# Patient Record
Sex: MALE | Race: WHITE | Employment: FULL TIME | URBAN - METROPOLITAN AREA
[De-identification: names, ages, dates, MRNs, and addresses within clinical notes are randomized per-mention and may not be internally consistent; named-entity substitution may affect disease eponyms.]

---

## 2019-08-04 ENCOUNTER — HOSPITAL ENCOUNTER (EMERGENCY)
Age: 34
Discharge: HOME | End: 2019-08-04

## 2019-08-04 VITALS
HEART RATE: 99 BPM | BODY MASS INDEX: 30.31 KG/M2 | RESPIRATION RATE: 18 BRPM | HEIGHT: 68 IN | DIASTOLIC BLOOD PRESSURE: 78 MMHG | TEMPERATURE: 98.6 F | WEIGHT: 200 LBS | SYSTOLIC BLOOD PRESSURE: 128 MMHG | OXYGEN SATURATION: 100 %

## 2019-08-04 DIAGNOSIS — H18.891 CORNEAL RUST RING OF RIGHT EYE: ICD-10-CM

## 2019-08-04 DIAGNOSIS — T15.91XA FOREIGN BODY OF RIGHT EXTERNAL EYE, INITIAL ENCOUNTER: Primary | ICD-10-CM

## 2019-08-04 PROCEDURE — 99282 EMERGENCY DEPT VISIT SF MDM: CPT

## 2019-08-04 PROCEDURE — 6370000000 HC RX 637 (ALT 250 FOR IP): Performed by: NURSE PRACTITIONER

## 2019-08-04 RX ORDER — ERYTHROMYCIN 5 MG/G
OINTMENT OPHTHALMIC ONCE
Status: COMPLETED | OUTPATIENT
Start: 2019-08-04 | End: 2019-08-04

## 2019-08-04 RX ORDER — ERYTHROMYCIN 5 MG/G
OINTMENT OPHTHALMIC
Qty: 1 TUBE | Refills: 0 | Status: SHIPPED | OUTPATIENT
Start: 2019-08-04

## 2019-08-04 RX ADMIN — ERYTHROMYCIN: 5 OINTMENT OPHTHALMIC at 22:02

## 2019-08-04 ASSESSMENT — ENCOUNTER SYMPTOMS
EYE DISCHARGE: 0
EYE ITCHING: 0
EYE REDNESS: 1
PHOTOPHOBIA: 1
EYE PAIN: 1

## 2019-08-04 ASSESSMENT — VISUAL ACUITY
OD: 20/40
OS: 20/40
OU: 20/30

## 2019-08-04 ASSESSMENT — PAIN SCALES - GENERAL: PAINLEVEL_OUTOF10: 4

## 2019-08-05 NOTE — ED PROVIDER NOTES
[address] ED  EMERGENCY DEPARTMENT ENCOUNTER        Pt Name: Ahsan Guzman  MRN: [id number]  [place] 1985  Date of evaluation: 8/4/2019  Provider: BRANDON Hand CNP  PCP: No primary care provider on file. This patient was not seen and evaluated by the attending physician No att. providers found. CHIEF COMPLAINT       Chief Complaint   Patient presents with    Foreign Body in Eye     pt. states he was working on his truck x4 days ago and states he thinks there is something in his eye       HISTORY OF PRESENT ILLNESS   (Location/Symptom, Timing/Onset, Context/Setting, Quality, Duration, Modifying Factors, Severity)  Note limiting factors. Ahsan Guzman is a 35 y.o. male who presents the emergency department with complaints of a foreign body in his right eye. He reports that 4 days ago he is working on a truck and felt something fall into his eye. He has tried to flush his eye and remove it but has not had any success. He reports continued pain, photophobia and blurry vision. He does not wear contacts or glasses. His tetanus is up-to-date. He rates the pain 4 out of 10 and it is a constant pain. He has not tried any medication for the pain. Nursing Notes were all reviewed and agreed with or any disagreements were addressed  in the HPI. REVIEW OF SYSTEMS    (2-9 systems for level 4, 10 or more for level 5)     Review of Systems   Constitutional: Negative for chills and fever. Eyes: Positive for photophobia, pain, redness and visual disturbance. Negative for discharge and itching. All other systems reviewed and are negative. Positives and Pertinent negatives as per HPI. Except as noted abovein the ROS, all other systems were reviewed and negative.        PAST MEDICAL HISTORY     Past Medical History:   Diagnosis Date    Hemorrhoids          SURGICAL HISTORY     Past Surgical History:   Procedure Laterality Date    DENTAL SURGERY      [facility]LAKE medications:  Medications   fluorescein ophthalmic strip 1 mg (has no administration in time range)   erythromycin [facility]) ophthalmic ointment ( Right Eye Given 8/4/19 2202)       Patient presented to the emergency department with complaints of foreign body in his right eye. Physical exam did reveal foreign body with rust ring at 3 o'clock position of his right eye. I did attempt to remove this with a cotton swab without success. I suspect that with the duration of his symptoms that his cornea is actually healed over the foreign body. His tetanus is up-to-date. At this time I will discharge the patient with instructions to follow-up with [facility] to have the foreign body removed and the rest ring removed. Will be started on erythromycin ointment for antibiotic prophylaxis. He was also given Voltaren for pain. He is to return to the emergency department any worsening of symptoms. Discussed treatment plan with patient, patient is agreeable and denies questions at this time. No results found for this visit on 08/04/19. I estimate there is LOW risk for a CORNEAL or LID FOREIGN BODY or ULCERATION, DEEP SPACE INFECTION (e.g., ORBITAL CELLULITIS OR ABSCESS), GLAUCOMA, MENINGITIS, PENETRATING GLOBE INJURY, or RETINAL DETACHMENT, thus I consider the discharge disposition reasonable. Also, there is no evidence or peritonitis, sepsis, or toxicity. Yesenia Oconnell and I have discussed the diagnosis and risks, and we agree with discharging home to follow-up with their primary doctor. We also discussed returning to the Emergency Department immediately if new or worsening symptoms occur. We have discussed the symptoms which are most concerning (e.g., changing or worsening pain, vision changes, neck stiffness or fever) that necessitate immediate return. Final Impression  1. Foreign body of right external eye, initial encounter    2.  Corneal rust ring of right eye        Discharge Vital Signs:  Blood

## 2019-09-11 ENCOUNTER — HOSPITAL ENCOUNTER (EMERGENCY)
Age: 34
Discharge: HOME | End: 2019-09-11

## 2019-09-11 VITALS
HEART RATE: 108 BPM | DIASTOLIC BLOOD PRESSURE: 72 MMHG | SYSTOLIC BLOOD PRESSURE: 121 MMHG | TEMPERATURE: 98.1 F | BODY MASS INDEX: 28.79 KG/M2 | OXYGEN SATURATION: 96 % | RESPIRATION RATE: 16 BRPM | HEIGHT: 68 IN | WEIGHT: 190 LBS

## 2019-09-11 DIAGNOSIS — L02.01 FACIAL ABSCESS: Primary | ICD-10-CM

## 2019-09-11 PROCEDURE — 6370000000 HC RX 637 (ALT 250 FOR IP): Performed by: PHYSICIAN ASSISTANT

## 2019-09-11 PROCEDURE — 99282 EMERGENCY DEPT VISIT SF MDM: CPT

## 2019-09-11 PROCEDURE — 4500000021 HC ED LEVEL 1 PROCEDURE

## 2019-09-11 RX ORDER — IBUPROFEN 600 MG/1
600 TABLET ORAL EVERY 6 HOURS PRN
Qty: 30 TABLET | Refills: 0 | Status: SHIPPED | OUTPATIENT
Start: 2019-09-11

## 2019-09-11 RX ORDER — CLINDAMYCIN HYDROCHLORIDE 150 MG/1
300 CAPSULE ORAL ONCE
Status: COMPLETED | OUTPATIENT
Start: 2019-09-11 | End: 2019-09-11

## 2019-09-11 RX ORDER — CLINDAMYCIN HYDROCHLORIDE 300 MG/1
300 CAPSULE ORAL 4 TIMES DAILY
Qty: 40 CAPSULE | Refills: 0 | Status: SHIPPED | OUTPATIENT
Start: 2019-09-11 | End: 2019-09-21

## 2019-09-11 RX ADMIN — CLINDAMYCIN HYDROCHLORIDE 300 MG: 150 CAPSULE ORAL at 19:51

## 2019-09-11 ASSESSMENT — PAIN SCALES - GENERAL: PAINLEVEL_OUTOF10: 3

## 2019-09-11 NOTE — ED NOTES
Pt discharged in stable condition, VSS, no signs of distress. Discharge instructions and meds reviewed. Pt verbalizes understanding and states no further questions or concerns unaddressed.        Destinee Ramos RN  09/11/19 1955

## 2019-09-11 NOTE — ED PROVIDER NOTES
normal.   3 x 3 cm indurated abscess over the right mandibular region. No crepitus or streaking. No vesicles. No trismus. Eyes: Right eye exhibits no discharge. Left eye exhibits no discharge. Neck: Normal range of motion. Cardiovascular: Normal rate, regular rhythm and normal heart sounds. Exam reveals no gallop and no friction rub. No murmur heard. Pulmonary/Chest: Effort normal and breath sounds normal. No stridor. No respiratory distress. He has no wheezes. He has no rales. Musculoskeletal: Normal range of motion. He exhibits no edema, tenderness or deformity. Neurological: He is alert and oriented to person, place, and time. No cranial nerve deficit. Skin: Skin is warm and dry. No rash noted. He is not diaphoretic. No erythema. Psychiatric: He has a normal mood and affect. His behavior is normal.   Nursing note and vitals reviewed. MEDICAL DECISION MAKING    Vitals:    Vitals:    09/11/19 1850   BP: 121/72   Pulse: 108   Resp: 16   Temp: 98.1 °F (36.7 °C)   TempSrc: Infrared   SpO2: 96%   Weight: 190 lb (86.2 kg)   Height: 5' 8\" (1.727 m)       LABS:Labs Reviewed - No data to display     Remainder of labs reviewed and werenegative at this time or not returned at the time of this note. RADIOLOGY:   Non-plain film images such as CT, Ultrasound and MRI are read by the radiologist. Mohinder Gracia PA-C have directly visualized the radiologic plain film image(s) with the below findings:        Interpretation per the Radiologist below, if available at the time of thisnote:    No orders to display        No results found.       MEDICAL DECISION MAKING / ED COURSE:      PROCEDURES:   Incision/Drainage  Performed by: Yuliya Peraza PA-C  Authorized by: Yuliya Peraza PA-C     Consent:     Consent obtained:  Verbal    Consent given by:  Patient    Risks discussed:  Bleeding, incomplete drainage, pain and infection    Alternatives discussed:  No treatment  Location:     Type: PM      PATIENT REFERRED TO:  [facility]    Schedule an appointment as soon as possible for a visit in 3 days  [phone], For re-check    [facility] Emergency Department  [address]          DISCHARGE MEDICATIONS:  New Prescriptions    CLINDAMYCIN (CLEOCIN) 300 MG CAPSULE    Take 1 capsule by mouth 4 times daily for 10 days May sub Generic and 150 mg capsules and 2x the amount    IBUPROFEN (ADVIL;MOTRIN) 600 MG TABLET    Take 1 tablet by mouth every 6 hours as needed for Pain       DISCONTINUED MEDICATIONS:  Discontinued Medications    No medications on file              (Please note the MDM and HPI sections of this note were completed with a voice recognition program.  Efforts weremade to edit the dictations but occasionally words are mis-transcribed.)    Electronically signed, Nic Tompkins PA-C,          Nic Tompkins PA-C  09/11/19 8507

## 2019-10-04 ENCOUNTER — HOSPITAL ENCOUNTER (EMERGENCY)
Age: 34
Discharge: HOME | End: 2019-10-04

## 2019-10-04 VITALS
TEMPERATURE: 98.1 F | DIASTOLIC BLOOD PRESSURE: 85 MMHG | SYSTOLIC BLOOD PRESSURE: 140 MMHG | HEART RATE: 98 BPM | BODY MASS INDEX: 28.04 KG/M2 | WEIGHT: 185 LBS | OXYGEN SATURATION: 98 % | RESPIRATION RATE: 14 BRPM | HEIGHT: 68 IN

## 2019-10-04 DIAGNOSIS — T07.XXXA MULTIPLE WOUNDS: ICD-10-CM

## 2019-10-04 DIAGNOSIS — L02.01 FACIAL ABSCESS: Primary | ICD-10-CM

## 2019-10-04 PROCEDURE — 99282 EMERGENCY DEPT VISIT SF MDM: CPT

## 2019-10-04 RX ORDER — CLINDAMYCIN HYDROCHLORIDE 300 MG/1
300 CAPSULE ORAL 4 TIMES DAILY
Qty: 40 CAPSULE | Refills: 0 | Status: SHIPPED | OUTPATIENT
Start: 2019-10-04 | End: 2019-10-14

## 2019-10-04 ASSESSMENT — PAIN SCALES - GENERAL: PAINLEVEL_OUTOF10: 4
